# Patient Record
Sex: MALE | Race: WHITE
[De-identification: names, ages, dates, MRNs, and addresses within clinical notes are randomized per-mention and may not be internally consistent; named-entity substitution may affect disease eponyms.]

---

## 2017-01-22 ENCOUNTER — HOSPITAL ENCOUNTER (EMERGENCY)
Dept: HOSPITAL 75 - ER | Age: 52
Discharge: HOME | End: 2017-01-22
Payer: MEDICARE

## 2017-01-22 VITALS — DIASTOLIC BLOOD PRESSURE: 80 MMHG | SYSTOLIC BLOOD PRESSURE: 138 MMHG

## 2017-01-22 VITALS — HEIGHT: 69.5 IN | WEIGHT: 297 LBS | BODY MASS INDEX: 43 KG/M2
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
## 2017-01-22 DIAGNOSIS — S81.011A: Primary | ICD-10-CM

## 2017-01-22 PROCEDURE — 12020 TX SUPFC WND DEHSN SMPL CLSR: CPT

## 2017-01-22 NOTE — ED LOWER EXTREMITY
General


Chief Complaint:  Laceration


Stated Complaint:  R LEG BLEEDING


Nursing Triage Note:  


PT REPORTS CERAMIC DECORATIONS BREAKING AND CUTTING LEG.  [place] STAFF STATES 


UNABLE TO GET IT TO STOP BLEEDING.  PT DENIES PAIN. BROUGHT IN BY , BUT ABLE 


TO TRANSFER TO BED WITHOUT ISSUE.


Nursing Sepsis Screen:  No Definite Risk


Source:  patient


Exam Limitations:  no limitations (high functioning MR)





History of Present Illness


Time seen by provider:  15:11


Initial Comments


51-year-old male patient presents to the emergency department with complaints 

of a laceration to the right knee.  Patient states he fell onto a ceramic 

[place] cutting the right leg.  Denies numbness, weakness.  Denies hitting 

his head or loss of consciousness.  Staff reports unable to get the laceration 

to quit bleeding.


Location Injury Occurred:  home


Onset:  just prior to arrival


Pain/Injury Location:  right knee


Method of Injury:  fell, incised


Modifying Factors:  Worse With Other (unable to stop the bleeding with 

compression.)





Allergies and Home Medications


Allergies


Coded Allergies:  


     No Known Drug Allergies (Unverified , 12/28/10)





Home Medications


Aspirin 81 Mg Chew 81 MG PO DAILY (Reported) 


Cholecalciferol 1,000 Unit Tablet 1,000 UNIT PO (Reported) 


Ferrous Sulfate 325 ( 65 )Mg Capsule.sa 325 ( PO (Reported) 


Hctz/Lisinopril 1 Each Tablet 1 EACH PO DAILY (Reported) 


Levothyroxine Sodium 75 Mcg Tablet 1 EACH PO DAILY (Reported) 


Metformin Hcl 500 Mg Tab 1,000 MG PO BID (Reported) 


Metoclopramide Hcl 10 Mg Tab 1 EACH PO QID PRN (Reported) 


Omeprazole 40 Mg Capsule.dr 40 MG PO HS (Reported) 


Sertraline Hcl 100 Mg Tablet 2 TAB PO DAILY (Reported) 


Ziprasidone 40 Mg Cap 40 MG PO BIDPC (Reported) 





Constitutional:   no symptoms reported


Respiratory:   no symptoms reported


Cardiovascular:   no symptoms reported


Musculoskeletal:  No back pain, No joint pain, No joint swelling, No neck pain


Skin:   see HPI other (laceration rt knee)


Psychiatric/Neurological:  Denies Headache, Denies Numbness, Denies Paresthesia

, Denies Tingling, Denies Weakness


All Other Systems Reviewed


Negative Unless Noted:  Yes (Negative excepted noted.)





Past Medical-Social-Family Hx


Patient Social History


Alcohol Use:  Denies Use


Recreational Drug Use:  No


Smoking Status:  Never a Smoker


Recent Foreign Travel:  No


Contact w/Someone Who Travel:  No


Recent Infectious Disease Expo:  No


Recent Hopitalizations:  No


Physical Abuse Screen:  No


Sexual Abuse:  No





Immunizations Up To Date


Tetanus Booster (TDap):  Less than 5yrs (4 yrs ago.)





Surgeries


HX Surgeries:  Yes (LEFT ANKLE)


Surgeries:  Orthopedic





Respiratory


Hx Respiratory Disorders:  No





Cardiovascular


Hx Cardiac Disorders:  Yes


Cardiac Disorders:  Hypertension





Neurological


Hx Neurological Disorders:  No





Reproductive System


Hx Reproductive Disorders:  No


Sexually Transmitted Disease:  No





Genitourinary


Hx Genitourinary Disorders:  No





Gastrointestinal


Hx Gastrointestinal Disorders:  No





Musculoskeletal


Hx Musculoskeletal Disorders:  Yes


Musculoskeletal Disorders:  Arthritis





Endocrine


Hx Endocrine Disorders:  Yes


Endocrine Disorders:  Hypothyroidsim





HEENT


HX ENT Disorders:  No





Psychosocial


Hx Psychiatric Problems:  Yes (iMPULSE CONTROL DISORDER NOS, MR)


Behavioral Health Disorders:  Depression





Integumentary


HX Skin/Integumentary Disorder:  No





Blood Transfusions


Hx Blood Disorders:  Yes (ANEMIA)





Reviewed Nursing Assessment


Reviewed/Agree w Nursing PMH:  Yes





Family Medical History


Significant Family History:  No Pertinent Family Hx





Physical Exam


Vital Signs





 Vital Sign - Last 12Hours








 1/22/17





 14:35


 


Temp 98.5


 


Pulse 75


 


Resp 18


 


B/P 132/80


 


Pulse Ox 99


 


O2 Delivery Room Air





Capillary Refill : Less Than 3 Seconds


General Appearance:   WD/WN no apparent distress


Cardiovascular:   normal peripheral pulses no edema


Legs:  bilateral leg non-tender, bilateral leg normal range of motion, 

bilateral leg no evidence of injury, bilateral leg other (venous stasis changes 

bilaterally.)


Knees:  bilateral knee non-tender, left knee normal inspection, bilateral knee 

normal range of motion, left knee no evidence of injury, right knee other (4 cm 

linear laceration of the right anterior knee with mild hemorrhage.)


Ankles:  bilateral ankle non-tender, bilateral ankle normal range of motion, 

bilateral ankle no evidence of injury, bilateral ankle other (venous stasis 

changes bilaterally.)


Feet:  bilateral foot non-tender, bilateral foot normal range of motion, 

bilateral foot no evidence of injury, bilateral foot other (venous stasis 

changes of the BLE)


Neurologic/Tendon:   normal sensation normal motor functions normal tendon 

functions responds to pain no evidence tendon injury


Neurologic/Psychiatric:   no motor/sensory deficits alert normal mood/affect 

oriented x 3


Skin:   warm/dry other (4 cm laceration rt anterior knee with mild hemorrhage.)





Laceration Repair :  


   Wound Location:  Lower Extremities (rt anterior knee)


   Wound Length (cm):  4


   Wound's Depth, Shape:  superficial, linear


   Wound Explored:  clean (wound explored.  No foreign body.)


   Betadine Prep?:  Yes (and scrubbed with chlorhexidine and sterile saline)


   Anesthesia:  Lidocaine w/ Epi


   Suture:  Ethlion


   Suture Size:  3-0


   Other Closure Supply:  Steri Strip 1/4", Mastisol


   Number of Sutures:  4


   Sterile Dressing Applied?:  Yes


Progress


blood loss minimal.  Patient tolerated the procedure well.





Progress/Results/Core Measures


Results/Orders


My Orders





 Orders-HARITHA SEAMAN


Lidocaine/Epi 1% 1:100,000 (Xylocaine /E (1/22/17 14:45)





Vital Signs/I&O





 Vital Sign - Last 12Hours








 1/22/17





 14:35


 


Temp 98.5


 


Pulse 75


 


Resp 18


 


B/P 132/80


 


Pulse Ox 99


 


O2 Delivery Room Air








Blood Pressure Mean:  97





Departure


Communication


Progress Notes


patient seen, evaluated, and wound repair performed.





Impression


Impression:  


 Primary Impression:  


 Laceration of knee without complication


Disposition:  01 HOME, SELF-CARE


Condition:  Improved





Departure-Patient Inst.


Decision time for Depature:  15:11


Referrals:  


[facility] (PCP/Family)


Primary Care Physician


Patient Instructions:  Laceration Repair With Stitches (DC)





Add. Discharge Instructions:


All discharge instructions reviewed with patient and/or family. Voiced 

understanding.  Medications as directed.  Tylenol Extra Strength over-the-

counter as directed for pain.  Ibuprofen 800 mg by mouth every 8 hours as 

needed for pain.  Elevate the right knee on pillows.  Ice pack for 20 minute 

intervals 6 times daily for 3 days.  Return to the emergency department in 12 

days for suture removal.  Follow-up with your family practitioner if needed.  

Return to the emergency department immediately for worsened pain, redness, 

drainage, fever, or any other concerns.








HARITHA SEAMAN Jan 22, 2017 15:14

## 2019-01-25 ENCOUNTER — HOSPITAL ENCOUNTER (OUTPATIENT)
Dept: HOSPITAL 75 - RAD | Age: 54
End: 2019-01-25
Attending: FAMILY MEDICINE
Payer: MEDICARE

## 2019-01-25 DIAGNOSIS — Z90.49: ICD-10-CM

## 2019-01-25 DIAGNOSIS — K76.0: Primary | ICD-10-CM

## 2019-01-25 DIAGNOSIS — R16.0: ICD-10-CM

## 2019-01-25 PROCEDURE — 76705 ECHO EXAM OF ABDOMEN: CPT

## 2019-01-25 NOTE — DIAGNOSTIC IMAGING REPORT
PROCEDURE: US Hepatic (Liver).



TECHNIQUE: Multiple real-time grayscale images were obtained over

the right upper quadrant in various projections.



INDICATION: Elevated liver function tests.



FINDINGS: Liver is enlarged at 23.3 cm. The liver does appear to

be echogenic suggestive of hepatic steatosis. No discrete liver

mass is seen. The gallbladder is surgically absent. No biliary

duct dilatation is seen. Pancreas is poorly visualized due to

patient body habitus. Right kidney is unremarkable. There is no

ascites.



IMPRESSION: Hepatic steatosis and hepatomegaly. No other

significant abnormality is detected.



Dictated by: 



  Dictated on workstation # [id number]

## 2019-08-29 ENCOUNTER — HOSPITAL ENCOUNTER (OUTPATIENT)
Dept: HOSPITAL 75 - CARD | Age: 54
End: 2019-08-29
Attending: INTERNAL MEDICINE
Payer: MEDICARE

## 2019-08-29 VITALS — BODY MASS INDEX: 46.65 KG/M2 | HEIGHT: 69 IN | WEIGHT: 315 LBS

## 2019-08-29 VITALS — SYSTOLIC BLOOD PRESSURE: 165 MMHG | DIASTOLIC BLOOD PRESSURE: 93 MMHG

## 2019-08-29 DIAGNOSIS — I10: ICD-10-CM

## 2019-08-29 DIAGNOSIS — E78.49: ICD-10-CM

## 2019-08-29 DIAGNOSIS — E11.9: Primary | ICD-10-CM

## 2019-08-29 DIAGNOSIS — E66.01: ICD-10-CM

## 2019-08-29 PROCEDURE — 78452 HT MUSCLE IMAGE SPECT MULT: CPT

## 2019-08-29 PROCEDURE — 93017 CV STRESS TEST TRACING ONLY: CPT

## 2019-08-29 PROCEDURE — 93306 TTE W/DOPPLER COMPLETE: CPT

## 2019-08-29 RX ADMIN — Medication PRN ML: at 09:08

## 2019-08-29 RX ADMIN — Medication PRN ML: at 07:28

## 2019-10-23 ENCOUNTER — HOSPITAL ENCOUNTER (OUTPATIENT)
Dept: HOSPITAL 75 - RT | Age: 54
End: 2019-10-23
Attending: NURSE PRACTITIONER
Payer: MEDICARE

## 2019-10-23 DIAGNOSIS — J30.9: Primary | ICD-10-CM

## 2019-10-23 DIAGNOSIS — G47.33: ICD-10-CM

## 2019-10-23 DIAGNOSIS — E66.01: ICD-10-CM

## 2019-10-23 PROCEDURE — 94729 DIFFUSING CAPACITY: CPT

## 2019-10-23 PROCEDURE — 94060 EVALUATION OF WHEEZING: CPT

## 2019-11-14 ENCOUNTER — HOSPITAL ENCOUNTER (OUTPATIENT)
Dept: HOSPITAL 75 - CATH | Age: 54
Discharge: HOME | End: 2019-11-14
Attending: INTERNAL MEDICINE
Payer: MEDICARE

## 2019-11-14 VITALS — SYSTOLIC BLOOD PRESSURE: 150 MMHG | DIASTOLIC BLOOD PRESSURE: 90 MMHG

## 2019-11-14 VITALS — DIASTOLIC BLOOD PRESSURE: 101 MMHG | SYSTOLIC BLOOD PRESSURE: 167 MMHG

## 2019-11-14 VITALS — SYSTOLIC BLOOD PRESSURE: 152 MMHG | DIASTOLIC BLOOD PRESSURE: 84 MMHG

## 2019-11-14 VITALS — SYSTOLIC BLOOD PRESSURE: 133 MMHG | DIASTOLIC BLOOD PRESSURE: 92 MMHG

## 2019-11-14 VITALS — SYSTOLIC BLOOD PRESSURE: 149 MMHG | DIASTOLIC BLOOD PRESSURE: 91 MMHG

## 2019-11-14 VITALS — DIASTOLIC BLOOD PRESSURE: 93 MMHG | SYSTOLIC BLOOD PRESSURE: 157 MMHG

## 2019-11-14 VITALS — WEIGHT: 315 LBS | BODY MASS INDEX: 45.61 KG/M2 | HEIGHT: 69.49 IN

## 2019-11-14 VITALS — DIASTOLIC BLOOD PRESSURE: 96 MMHG | SYSTOLIC BLOOD PRESSURE: 152 MMHG

## 2019-11-14 VITALS — DIASTOLIC BLOOD PRESSURE: 90 MMHG | SYSTOLIC BLOOD PRESSURE: 150 MMHG

## 2019-11-14 VITALS — DIASTOLIC BLOOD PRESSURE: 96 MMHG | SYSTOLIC BLOOD PRESSURE: 151 MMHG

## 2019-11-14 VITALS — DIASTOLIC BLOOD PRESSURE: 95 MMHG | SYSTOLIC BLOOD PRESSURE: 147 MMHG

## 2019-11-14 DIAGNOSIS — E78.5: ICD-10-CM

## 2019-11-14 DIAGNOSIS — E11.9: ICD-10-CM

## 2019-11-14 DIAGNOSIS — R94.39: Primary | ICD-10-CM

## 2019-11-14 DIAGNOSIS — Z79.899: ICD-10-CM

## 2019-11-14 DIAGNOSIS — I10: ICD-10-CM

## 2019-11-14 DIAGNOSIS — Z79.51: ICD-10-CM

## 2019-11-14 DIAGNOSIS — Z79.82: ICD-10-CM

## 2019-11-14 LAB
ALBUMIN SERPL-MCNC: 4.2 GM/DL (ref 3.2–4.5)
ALP SERPL-CCNC: 83 U/L (ref 40–136)
ALT SERPL-CCNC: 30 U/L (ref 0–55)
APTT BLD: 27 SEC (ref 24–35)
BILIRUB SERPL-MCNC: 0.4 MG/DL (ref 0.1–1)
BUN/CREAT SERPL: 21
CALCIUM SERPL-MCNC: 9.3 MG/DL (ref 8.5–10.1)
CHLORIDE SERPL-SCNC: 100 MMOL/L (ref 98–107)
CO2 SERPL-SCNC: 26 MMOL/L (ref 21–32)
CREAT SERPL-MCNC: 1.24 MG/DL (ref 0.6–1.3)
ERYTHROCYTE [DISTWIDTH] IN BLOOD BY AUTOMATED COUNT: 13.9 % (ref 10–14.5)
GFR SERPLBLD BASED ON 1.73 SQ M-ARVRAT: > 60 ML/MIN
GLUCOSE SERPL-MCNC: 158 MG/DL (ref 70–105)
HCT VFR BLD CALC: 43 % (ref 40–54)
HGB BLD-MCNC: 12.8 G/DL (ref 13.3–17.7)
INR PPP: 0.9 (ref 0.8–1.4)
MCH RBC QN AUTO: 29 PG (ref 25–34)
MCHC RBC AUTO-ENTMCNC: 30 G/DL (ref 32–36)
MCV RBC AUTO: 96 FL (ref 80–99)
PLATELET # BLD: 192 10^3/UL (ref 130–400)
PMV BLD AUTO: 10.4 FL (ref 7.4–10.4)
POTASSIUM SERPL-SCNC: 5.1 MMOL/L (ref 3.6–5)
PROT SERPL-MCNC: 7.8 GM/DL (ref 6.4–8.2)
PROTHROMBIN TIME: 12.2 SEC (ref 12.2–14.7)
SODIUM SERPL-SCNC: 138 MMOL/L (ref 135–145)
WBC # BLD AUTO: 10.1 10^3/UL (ref 4.3–11)

## 2019-11-14 PROCEDURE — 85730 THROMBOPLASTIN TIME PARTIAL: CPT

## 2019-11-14 PROCEDURE — 80053 COMPREHEN METABOLIC PANEL: CPT

## 2019-11-14 PROCEDURE — 36415 COLL VENOUS BLD VENIPUNCTURE: CPT

## 2019-11-14 PROCEDURE — 85027 COMPLETE CBC AUTOMATED: CPT

## 2019-11-14 PROCEDURE — 93458 L HRT ARTERY/VENTRICLE ANGIO: CPT

## 2019-11-14 PROCEDURE — 85610 PROTHROMBIN TIME: CPT

## 2019-11-14 PROCEDURE — 87081 CULTURE SCREEN ONLY: CPT

## 2019-11-14 NOTE — ANESTHESIA-PROCEDURE NOTE
Procedures/Interventions


Procedure Start/Stop/Diagnosis


Date of Procedure:  Nov 14, 2019


Start Time:  07:55


Referring Physician:  zelalem


Stop Time:  08:10





Central Line/IV Access


IV :  


   Location:  Left


   Site:  Hand


   IV Catheter Type:  Peripheral IV


   IV Catheter Gauge:  22


   Procedure Conclusion


Called by cath lab staff for difficult IV. Placed by LYDIA Booth.











JUDY LANDRUM CRNA            Nov 14, 2019 08:21


POS

## 2019-11-14 NOTE — DISCHARGE INST-POST CATH
Discharge Inst-CATH/EP


Problems Reviewed?:  Yes


Final Diagnosis


Patent epicardial coronary arteries, diastolic dysfunction


Post Cardiac Cath/EP D/C Inst


Follow Up/Plan


Follow-up with Dr. Hannon in 4 weeks.


<b>CARDIAC CATH/EP PROCEDURE DISCHARGE INSTRUCTIONS</b>





ACTIVITY





* Go Home directly and rest.


* Limit activity of the leg (or wrist if it was used) for 7 days including 

aerobics, swimming,


   jogging, bicycling, etc.


* Restrict stair-climbing for 7 days if possible, if not, climb up with your 

non-cath leg, then


   bring together on the same step.


* Avoid lifting, pushing, pulling or excessive movement of the affected 

extremity for 7 days.


* Customary sexual activity may be resumed after 2 days-use caution not to use a

position  


   that strains or causes pain to the affected extremity.


* No driving for 24 hours.


* NO SMOKING. 


* Avoid straining for bowel movements for 7 days.


* Gentle walking on level ground is allowed.


* Returning to work will depend on the type of procedure and the results. Your 

doctor will discuss


   this with you.





CALL YOUR DOCTOR FOR ANY OF THE FOLLOWING:





*If bleeding from the puncture site occurs- Apply gentle pressure to site with 

clean cloth and call


   your doctor or EMS.


* If a knot or lump forms under the skin, increases in size, or causes pain.


* If bruising appears to be worsening or moving further down your leg instead of

disappearing.


* Temperature above 101 F.





CARE OF YOUR GROIN INCISION;





* Bruising or purple discoloration of the skin near the puncture site is common.


* You may shower only, no bathtub bathing for 5 days.  Be careful to avoid 

slipping as your


   leg may feel stiff.


* If a closure device was used on your femoral artery, please see the attached 

guide regarding


   care of the device and your leg.


* Leave dressing on FOR 24 hours.





CARE OF YOUR WRIST INCISION;





* Bruising or purple discoloration of the skin near the puncture site is common.


* You may shower.


* DO NOT submerge wrist.


* Leave dressing on FOR 24 hours.











DAVIN HANNON MD            Nov 14, 2019 9:56 am


POS

## 2019-11-14 NOTE — HISTORY & PHYSICIAL-CARDIOLGY
HPI-Cardiology


Cardiology Consultation:


Date of Consultation


11/14/19


Date of Admission





Attending Physician


DAVIN Hannon MD


Admitting Physician


Leonarda Wang MD


Consulting Physician


DAVIN HANNON MD





HPI:


Time Seen by a Provider:  09:00


Chief Complaint:


Shortness of breath


This is a 54-year-old gentleman with history of diabetes, hypertension and 

hyperlipidemia.  He has history of shortness of breath.  Stress test showed 

possible anterior ischemia.  Coronary angiography is recommended.





Review of Systems-Cardiology


Review of Systems


Constitutional:  As described under HPI; No As described under HPI, No no 

symptoms reported, No chills, No fever, No lightheadedness


Eyes:  No As described under HPI, No no symptoms reported, No blindness, No 

blurred vision, No contact lenses, No drainage, No decreased acuity, No foreign 

body sensation, No pain, No vision change


Ears/Nose/Throat:  No As described under HPI, No no symptoms reported, No 

chronic hearing loss, No ear discharge, No ear pain, No nasal drainage, No 

ulcerations


Respiratory:  No no symptoms reported; As described under HPI; No As described 

under HPI, No cough, No orthopnea; shortness of breath; No SOB with excertion


Cardiovascular:  No no symptoms reported; As described under HPI; No As 

described under HPI, No edema, No irregular heart rate, No lightheadedness, No 

palpitations


Gastrointestinal:  No no symptoms reported, No As described under HPI, No 

abdomen distended, No abdominal pain, No blood streaked bowels, No constipation,

No diarrhea, No nausea, No vomiting, No stool coloration changes


Genitourinary:  No As described under HPI, No burning, No dysuria, No discharge,

No frequency, No flank pain, No hematuria, No urgency


Skin:  No rash, No skin related problems, No ulcerations


Psychiatric/Neurological:  No anxiety, No depression, No seizure, No focal 

weakness, No syncope


Hematologic:  No bleeding abnormalities





PMH-Social-Family Hx


Patient Social History


Alcohol Use:  Denies Use


Recreational Drug Use:  No


Smoking Status:  Never a Smoker


Recent Foreign Travel:  No


Recent Infectious Disease Expo:  No





Immunizations Up To Date


Tetanus Booster (TDap):  Less than 5yrs





Past Medical History


PMH


As described under Assessment.





Allergies and Home Medications


Allergies


Coded Allergies:  


     No Known Drug Allergies (Unverified , 12/28/10)





Home Medications


Acetaminophen 325 Mg Tablet, 650 MG PO Q6H PRN for PAIN-MILD (1-3) OR TEMPATURE,

(Reported)


Aspirin 81 Mg Tab.chew, 81 MG PO DAILY, (Reported)


   GIVE WITH FOOD 


Calcium Carbonate 500 Mg Tablet, 1,000 MG PO Q2H PRN for HEARTBURN, (Reported)


Calcium Carbonate/Vitamin D3 1 Each Tablet, 2 EACH PO DAILY, (Reported)


Clonazepam 0.5 Mg Tablet, 0.5 MG PO Q12H PRN for ANXIETY, (Reported)


Diphenhydramine HCl 25 Mg Tablet, 25 MG PO Q6H PRN for ALLERGIES, (Reported)


Ferrous Sulfate 325 Mg Tablet, 325 MG PO HS, (Reported)


Fluticasone Propionate 9.9 Ml [place].susp, 2 SPRAY NS DAILY PRN for CONGESTION, 

(Reported)


Guaifenesin/Dextromethorphan 5 Ml Syrup, 10 ML PO Q4H PRN for COUGH, (Reported)


Hydrocortisone 28.35 Gm Cream..g., 1 APPLIC TP Q8H PRN for RASH, (Reported)


Levothyroxine Sodium 75 Mcg Tablet, 75 MCG PO DAILY, (Reported)


Lisinopril 10 Mg Tablet, 10 MG PO DAILY, (Reported)


Loperamide HCl 2 Mg Tablet, 4 MG PO DAILY PRN for DIARRHEA, (Reported)


   TAKE 2 TABS AFTER EACH 1ST INITIAL STOOL THHEN 1 TAB FOLLOWING EACH 

SUBSEQUENT STOOL, DO NOT EXCEED 4 TABS IN 24 HRS 


Loperamide HCl 2 Mg Tablet, 2 MG PO PRN PRN for DIARRHEA, (Reported)


   TAKE 2 TABS AFTER EACH 1ST INITIAL STOOL THHEN 1 TAB FOLLOWING EACH 

SUBSEQUENT STOOL, DO NOT EXCEED 4 TABS IN 24 HRS 


Magnesium Hydroxide 400 Mg/5 Ml Oral.susp, 30 ML PO Q12H PRN for CONSTIPATION-

7TH LINE, (Reported)


Menthol 5 Mg Lozenge, 5 MG MM EVERY HOUR PRN for COUGH, (Reported)


Metformin HCl 1,000 Mg Tablet, 1,000 MG PO HS, (Reported)


Neomycin/Polymyxin/Bacitracin 0.9 Gm Oint, 1 APPLIC TP DAILY PRN for SKIN 

CONDITION, (Reported)


   APPLY TO MINOR CUTS OR ABRASIONS EVERY 24 HOURS AS NEEDED FOR SKIN CONDITION 


Pravastatin Sodium 10 Mg Tablet, 10 MG PO HS, (Reported)


Sertraline HCl 50 Mg Tablet, 50 MG PO DAILY, (Reported)


Tetrahydrozoline HCl 15 Ml Drops, 2 DROPS OU Q6H PRN for DRY EYES, (Reported)


Ziprasidone 40 Mg Cap, 40 MG PO BID, (Reported)





Patient Home Medication List


Home Medication List Reviewed:  Yes





Physical Exam-Cardiology


Physical Exam


Vital Signs/I&O











 11/14/19





 07:15


 


Temp 37.1


 


Pulse 87


 


Resp 18


 


B/P (MAP) 167/101 (123)


 


Pulse Ox 95


 


O2 Delivery Room Air





Capillary Refill :


Constitutional:  appears stated age, AAO x 3; No apparent distress; well-

developed, well-nourished


HEENT:  PERRL; No discharge; hearing is well preserved, oral hygience is good; 

No ulceration, No xanthelasmas are seen


Neck:  No carotid bruit; carotid pulses are 2 + bilaterally


Respiratory:  chest is bilaterally symmetric, lungs clear to auscultation


Cardiovascular:  regular rate-rhythm, S1 and S2


Gastrointestinal:  soft, audible bowel sounds; No spleenomegaly


Rectal:  deferred


Extremities:  normal range of motion, non-tender, normal inspection; No 

clubbing, No cyanosis; no lower extremity edema bilateral; No significant edema


Neurologic/Psychiatric:  no motor/sensory deficits, alert, normal mood/affect, 

oriented x 3, power is 5/5 both on sides


Skin:  normal color, warm/dry; No rash, No ulcerations





Data Review


Labs


Laboratory Tests


11/14/19 08:10: 


White Blood Count 10.1, Red Blood Count 4.41, Hemoglobin 12.8L, Hematocrit 43, 

Mean Corpuscular Volume 96, Mean Corpuscular Hemoglobin 29, Mean Corpuscular 

Hemoglobin Concent 30L, Red Cell Distribution Width 13.9, Platelet Count 192, 

Mean Platelet Volume 10.4, Prothrombin Time 12.2, INR Comment 0.9, Activated 

Partial Thromboplast Time 27, Sodium Level 138, Potassium Level 5.1H, Chloride 

Level 100, Carbon Dioxide Level 26, Anion Gap 12, Blood Urea Nitrogen 26H, 

Creatinine 1.24, Estimat Glomerular Filtration Rate > 60, BUN/Creatinine Ratio 

21, Glucose Level 158H, Calcium Level 9.3, Corrected Calcium 9.1, Total 

Bilirubin 0.4, Aspartate Amino Transf (AST/SGOT) 28, Alanine Aminotransferase 

(ALT/SGPT) 30, Alkaline Phosphatase 83, Total Protein 7.8, Albumin 4.2








A/P-Cardiology


Assessment/Admission Diagnosis


Shortness of breath, abnormal nuclear stress test.


Admission Status:  Observation





Plan


Coronary angiography is recommended.











DAVIN HANNON MD            Nov 14, 2019 11:03 am


POS

## 2019-11-14 NOTE — CARDIAC PROCEDURE NOTE-CS/ASA
Pre-Procedure Note


Pre-Op Procedure Note


H&P Reviewed


The H&P was reviewed, patient examined and no changes noted.


Date H&P Reviewed:  Nov 14, 2019


Time H&P Reviewed:  09:07





Conscious Sedation Pre-Proced


Time


09:07





ASA Score


3


For ASA 3 and 4: Consider anesthesia and medical clearance. Also, for patients

with a history of failed moderate sedation consider anesthesia.

















Airway 


 


Lungs 


 


Heart 


 


 ASA score


 


 ASA 1: a normal healthy patient


 


 ASA 2:  a patient with a mild systemic disease (mid diabetes, controlled 

hypertension, obesity 


 


 ASA 3:  a patient with a severe systemic disease that limits activity  (angina,

COPD, prior Myocardial infarction)


 


 ASA 4:  a patient with an incapacitating disease that is a constant threat to 

life (CHF, renal failure)


 


 ASA 5:  a moribund patient not expected to survive 24 hrs.  (ruptured aneurysm)


 


 ASA 6:  a declared brain-dead patient whose organs are being harvested.


 


 For emergent operations, add the letter E after the classification











Mallampati Classification


Grade 1





Sedation Plan


Analgesia, Amnesia, Plan communicated to team members, Discussed options with 

patient/fam, Discussed risks with patient/fam


The patient is an appropriate candidate to undergo the planned procedure, 

sedation, and anesthesia.





The patient immediately re-assessed prior to indication.











DAVIN MENDEZ MD            Nov 14, 2019 9:07 am


POS

## 2019-11-14 NOTE — CORONARY ANGIOGRAPHY REPORT
Coronary Angiography Report


DATE OF PROCEDURE: 11/14/19 





INDICATION: Shortness of breath, abnormal nuclear stress test.





PREOPERATIVE DIAGNOSIS: Shortness of breath, abnormal nuclear stress test.





POSTOPERATIVE DIAGNOSIS: Patent epicardial coronary arteries.





HISTORY:  This is a 54-year-old gentleman with history of diabetes, hypertension

and hyperlipidemia.  He complains of shortness of breath.  Nuclear stress test 

showed evidence of possible anterior ischemia.  Therefore, the patient was 

scheduled for coronary angiography. 





PROCEDURES PERFORMED: 


1.Coronary angiography. 


2.Left heart catheterization. 





COMPLICATIONS: None. 


SPECIMENS: None. 


ESTIMATED BLOOD LOSS: 10 mL


ANESTHESIA: Conscious sedation


ANTICOAGULATION: IV heparin


CONTRAST: 52 mL.


FLUOROSCOPY: 1.9 minutes.


FLOUROSCOPY DOSE: 708 mgy.





PROCEDURE DETAILS: The patient is a 54  male  and was brought to the cath lab 

after informed consent was taken. All the risks and complications were explained

in detail; this included the risk of bleeding, vascular damage, stroke, MI and 

even death. The patient was draped and prepped in the usual sterile fashion.  

Access was gained in the right radial artery with a 6 Occitan sheath.  Coronary 

angiography and left heart catheterization was performed with the Tiger 

catheter.





FINDINGS: 


1.Left main: Patent.


2.LAD: Patent.


3.Left circumflex artery: Patent.


4.RCA: Patent.


5.Left heart catheterization: LV pressure 145/22 mmHg.  LVEDP 34 mmHg.  Aortic 

pressure 142/95 mmHg.  Normal LV function with no wall motion abnormalities.  No

gradient across the aortic valve.





CONCLUSIONS: 


Patent epicardial coronary arteries.  Continue primary prevention measures.


Elevated LVEDP suggest diastolic dysfunction.





JAZZ Hannon MD, FACP, FACC, [facility]


Interventional Cardiology











DAVIN HANNON MD            Nov 14, 2019 9:53 am


POS

## 2019-11-14 NOTE — CARDIOLOGY DISCHARGE SUMMARY
Diagnosis/Chief Complaint


Date of Admission


11/14/2019


Date of Discharge


11/14/2019


Admission Diagnosis


Shortness of breath, abnormal nuclear stress test.





Final/Discharge Diagnosis


Patent epicardial coronary arteries, diastolic dysfunction





Chief Complaint/HPI


Chief Complaint/HPI


This is a 54-year-old gentleman with history of diabetes, hypertension and 

hyperlipidemia.  He has history of shortness of breath.  Stress test showed 

possible anterior ischemia.  Coronary angiography is recommended.





Discharge Summary


Procedures


Coronary angiography shows patent epicardial coronary arteries.


Elevated LVEDP suggest diastolic dysfunction.


Discharge Physical Examination


Normal cardiac vascular examination.





Hospital Course


Was the Problem List Reviewed?:  Yes


Unremarkable.


Pending Labs


Laboratory Tests


11/14/19 08:10: 


White Blood Count 10.1, Red Blood Count 4.41, Hemoglobin 12.8, Hematocrit 43, 

Mean Corpuscular Volume 96, Mean Corpuscular Hemoglobin 29, Mean Corpuscular 

Hemoglobin Concent 30, Red Cell Distribution Width 13.9, Platelet Count 192, 

Mean Platelet Volume 10.4, Prothrombin Time 12.2, INR Comment 0.9, Activated 

Partial Thromboplast Time 27, Sodium Level 138, Potassium Level 5.1, Chloride 

Level 100, Carbon Dioxide Level 26, Anion Gap 12, Blood Urea Nitrogen 26, 

Creatinine 1.24, Estimat Glomerular Filtration Rate > 60, BUN/Creatinine Ratio 

21, Glucose Level 158, Calcium Level 9.3, Corrected Calcium 9.1, Total Bilirubin

0.4, Aspartate Amino Transf (AST/SGOT) 28, Alanine Aminotransferase (ALT/SGPT) 

30, Alkaline Phosphatase 83, Total Protein 7.8, Albumin 4.2








Discussion & Recommendations


Discussion


Patient will continue same medications.  Follow-up in 4 weeks.  Discharge 

instructions will be provided.


Follow up appt.:  


Dr. Hannon in 4 weeks.


Dicharge Diet:  Cardiac Diet


Activity as Tolerated:  Yes


Home Medications


Reviewed patient Home Medication Reconciliation performed by pharmacy medication

reconciliations technician and/or nursing.


Patients Allergies have been reviewed.





Discharge


Home Medications:


Reviewed and agree with Discharge Medication list on patient's Discharge 

Instruction sheet


Condition at discharge


Stable.


Instructions to patient/family


Follow-up with Dr. Hannon in 4 weeks.











DAVIN HANNON MD            Nov 14, 2019 9:58 am


POS

## 2019-11-14 NOTE — NUR
MED REC WAS PUT IN FROM THE ORDER SUMMARY REPORT FROM [facility].



I DID FOLLOW UP ON THE LISINOPRIL, THERE WAS FOR 10MG (START DATE 09-) AND 5 MG 
(START DATE 07-) SHOWING ON THE ORDER SUMMARY. I CALLED [facility] AND THEY ONLY 
SHOWED 10MG. I THEN CALLED DR. BAIRD OFFICE TO DOUBLE CHECK AND WAS TOLD ON 09- THE 
ORDER WAS CHANGED TO DC THE 5MG AND START THE 10MG. THEREFORE ONLY THE 10MG IS ON THE MED 
REC AND I LET HIS CAREGIVER KNOW THAT THE ENTRY FOR THE 5 MG SHOULD BE TAKEN OFF THE ORDER 
SUMMARY REPORT. HE AGREED AND SAID THEY WOULD TAKE CARE OF IT.

## 2020-06-12 ENCOUNTER — HOSPITAL ENCOUNTER (OUTPATIENT)
Dept: HOSPITAL 75 - RAD | Age: 55
End: 2020-06-12
Attending: FAMILY MEDICINE
Payer: MEDICARE

## 2020-06-12 DIAGNOSIS — K76.0: Primary | ICD-10-CM

## 2020-06-12 PROCEDURE — 76705 ECHO EXAM OF ABDOMEN: CPT

## 2020-06-12 NOTE — DIAGNOSTIC IMAGING REPORT
PROCEDURE: US Hepatic (Liver).



TECHNIQUE: Multiple real-time grayscale images were obtained over

the right upper quadrant in various projections.



INDICATION: Hepatic steatosis.



FINDINGS: The liver is enlarged at 22.6 cm. There is diffuse

increased echogenicity throughout the liver consistent with

hepatic steatosis. No discrete liver mass is detected. The portal

vein is patent and shows normal direction of flow. Gallbladder is

nonvisualized. This could be surgically absent. Clinical

correlation is recommended. No biliary ductal dilatation is seen.

The visualized pancreas is unremarkable. Right kidney is without

calculi or hydronephrosis. There is no ascites.



IMPRESSION:

1. Hepatomegaly and hepatic steatosis.

2. Nonvisualized gallbladder, perhaps surgically absent. Clinical

correlation is recommended.



Dictated by: 



  Dictated on workstation # [id number]

## 2022-07-25 ENCOUNTER — HOSPITAL ENCOUNTER (OUTPATIENT)
Dept: HOSPITAL 75 - RAD | Age: 57
End: 2022-07-25
Attending: NURSE PRACTITIONER
Payer: MEDICARE

## 2022-07-25 DIAGNOSIS — R22.42: Primary | ICD-10-CM

## 2022-07-25 PROCEDURE — 76881 US COMPL JOINT R-T W/IMG: CPT

## 2022-07-25 NOTE — DIAGNOSTIC IMAGING REPORT
INDICATION: 

Pretibial subcutaneous nodule.



FINDINGS:

Ultrasound of the pretibial space of the left lower leg shows a

0.7 x 0.9 x 0.4 cm hypoechoic lesion with a relatively thick wall

and a sonolucent center that appears liquefied.



IMPRESSION: 

Pathologic fluid collection in the subcutaneous soft tissues of

the left lower leg. This could be a resolving hematoma. Other

considerations include foreign body granulation and/or abscess.



Dictated by: 



  Dictated on workstation # [id number]